# Patient Record
Sex: FEMALE | Employment: UNEMPLOYED | URBAN - METROPOLITAN AREA
[De-identification: names, ages, dates, MRNs, and addresses within clinical notes are randomized per-mention and may not be internally consistent; named-entity substitution may affect disease eponyms.]

---
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
## 2024-06-04 NOTE — PLAN OF CARE
Problem: Behavioral Health Plan of Care  Goal: Plan of Care Review  Outcome: Improving       Pt evaluation continues. Assessed mood, anxiety, thoughts, and behavior. Is progressing towards goals. Encourage participation in groups and developing healthy coping skills. Pt denies auditory or visual  hallucinations. Refer to daily team meeting notes for individualized plan of care. Will continue to assess.     Kirti continues on suicide and self injury precautions. She continues on Treatment Preparation phase. She denies suicidal ideation and self harm thoughts. She denies urges to punch the wall today. She states her hand no longer hurts and she has full range of motion and no swelling. She states she slept well last night. She states taking Lexapro at bedtime is working better for her as she feels more awake during the day. She denies any physical concerns. She is eating and drinking fluids well. She is attending groups and activities. She is looking forward to discharging tomorrow.    Spoke with patient regarding provider being on vacation and the need to change her appt... new appointment is Weds 7/24 @ 2PM

## 2024-07-13 ENCOUNTER — HEALTH MAINTENANCE LETTER (OUTPATIENT)
Age: 19
End: 2024-07-13

## 2024-11-07 SDOH — HEALTH STABILITY: PHYSICAL HEALTH: ON AVERAGE, HOW MANY DAYS PER WEEK DO YOU ENGAGE IN MODERATE TO STRENUOUS EXERCISE (LIKE A BRISK WALK)?: 5 DAYS

## 2024-11-07 SDOH — HEALTH STABILITY: PHYSICAL HEALTH: ON AVERAGE, HOW MANY MINUTES DO YOU ENGAGE IN EXERCISE AT THIS LEVEL?: 30 MIN

## 2024-11-07 ASSESSMENT — PATIENT HEALTH QUESTIONNAIRE - PHQ9: SUM OF ALL RESPONSES TO PHQ QUESTIONS 1-9: 3

## 2024-11-08 ENCOUNTER — OFFICE VISIT (OUTPATIENT)
Dept: FAMILY MEDICINE | Facility: CLINIC | Age: 19
End: 2024-11-08
Payer: COMMERCIAL

## 2024-11-08 ENCOUNTER — MYC MEDICAL ADVICE (OUTPATIENT)
Dept: FAMILY MEDICINE | Facility: CLINIC | Age: 19
End: 2024-11-08

## 2024-11-08 VITALS
SYSTOLIC BLOOD PRESSURE: 126 MMHG | RESPIRATION RATE: 16 BRPM | HEART RATE: 106 BPM | BODY MASS INDEX: 31.29 KG/M2 | DIASTOLIC BLOOD PRESSURE: 84 MMHG | WEIGHT: 187.8 LBS | OXYGEN SATURATION: 96 % | TEMPERATURE: 98.3 F | HEIGHT: 65 IN

## 2024-11-08 DIAGNOSIS — Z13.1 SCREENING FOR DIABETES MELLITUS: ICD-10-CM

## 2024-11-08 DIAGNOSIS — Z00.00 VISIT FOR PREVENTIVE HEALTH EXAMINATION: Primary | ICD-10-CM

## 2024-11-08 DIAGNOSIS — J30.1 SEASONAL ALLERGIC RHINITIS DUE TO POLLEN: ICD-10-CM

## 2024-11-08 DIAGNOSIS — Z13.6 ENCOUNTER FOR LIPID SCREENING FOR CARDIOVASCULAR DISEASE: ICD-10-CM

## 2024-11-08 DIAGNOSIS — Z11.4 SCREENING FOR HIV (HUMAN IMMUNODEFICIENCY VIRUS): ICD-10-CM

## 2024-11-08 DIAGNOSIS — E05.90 HYPERTHYROIDISM: ICD-10-CM

## 2024-11-08 DIAGNOSIS — Z13.220 ENCOUNTER FOR LIPID SCREENING FOR CARDIOVASCULAR DISEASE: ICD-10-CM

## 2024-11-08 DIAGNOSIS — Z71.85 IMMUNIZATION COUNSELING: ICD-10-CM

## 2024-11-08 DIAGNOSIS — Z11.59 NEED FOR HEPATITIS C SCREENING TEST: ICD-10-CM

## 2024-11-08 LAB
ANION GAP SERPL CALCULATED.3IONS-SCNC: 7 MMOL/L (ref 7–15)
BUN SERPL-MCNC: 6.8 MG/DL (ref 6–20)
CALCIUM SERPL-MCNC: 9.7 MG/DL (ref 8.8–10.4)
CHLORIDE SERPL-SCNC: 105 MMOL/L (ref 98–107)
CHOLEST SERPL-MCNC: 174 MG/DL
CREAT SERPL-MCNC: 0.65 MG/DL (ref 0.51–0.95)
EGFRCR SERPLBLD CKD-EPI 2021: >90 ML/MIN/1.73M2
FASTING STATUS PATIENT QL REPORTED: ABNORMAL
FASTING STATUS PATIENT QL REPORTED: NORMAL
GLUCOSE SERPL-MCNC: 94 MG/DL (ref 70–99)
HCO3 SERPL-SCNC: 25 MMOL/L (ref 22–29)
HDLC SERPL-MCNC: 55 MG/DL
HIV 1+2 AB+HIV1 P24 AG SERPL QL IA: NONREACTIVE
LDLC SERPL CALC-MCNC: 104 MG/DL
NONHDLC SERPL-MCNC: 119 MG/DL
POTASSIUM SERPL-SCNC: 4.4 MMOL/L (ref 3.4–5.3)
SODIUM SERPL-SCNC: 137 MMOL/L (ref 135–145)
T4 FREE SERPL-MCNC: 2.35 NG/DL (ref 0.9–1.7)
TRIGL SERPL-MCNC: 76 MG/DL
TSH SERPL DL<=0.005 MIU/L-ACNC: <0.01 UIU/ML (ref 0.3–4.2)

## 2024-11-08 PROCEDURE — 36415 COLL VENOUS BLD VENIPUNCTURE: CPT

## 2024-11-08 PROCEDURE — 80061 LIPID PANEL: CPT

## 2024-11-08 PROCEDURE — 90480 ADMN SARSCOV2 VAC 1/ONLY CMP: CPT

## 2024-11-08 PROCEDURE — 86706 HEP B SURFACE ANTIBODY: CPT

## 2024-11-08 PROCEDURE — 87389 HIV-1 AG W/HIV-1&-2 AB AG IA: CPT

## 2024-11-08 PROCEDURE — 86481 TB AG RESPONSE T-CELL SUSP: CPT

## 2024-11-08 PROCEDURE — 90471 IMMUNIZATION ADMIN: CPT

## 2024-11-08 PROCEDURE — 86735 MUMPS ANTIBODY: CPT

## 2024-11-08 PROCEDURE — 86765 RUBEOLA ANTIBODY: CPT

## 2024-11-08 PROCEDURE — 99000 SPECIMEN HANDLING OFFICE-LAB: CPT

## 2024-11-08 PROCEDURE — 84439 ASSAY OF FREE THYROXINE: CPT

## 2024-11-08 PROCEDURE — 99395 PREV VISIT EST AGE 18-39: CPT | Mod: 25

## 2024-11-08 PROCEDURE — 86803 HEPATITIS C AB TEST: CPT

## 2024-11-08 PROCEDURE — 86787 VARICELLA-ZOSTER ANTIBODY: CPT

## 2024-11-08 PROCEDURE — 86762 RUBELLA ANTIBODY: CPT

## 2024-11-08 PROCEDURE — 99213 OFFICE O/P EST LOW 20 MIN: CPT | Mod: 25

## 2024-11-08 PROCEDURE — 80048 BASIC METABOLIC PNL TOTAL CA: CPT

## 2024-11-08 PROCEDURE — 90656 IIV3 VACC NO PRSV 0.5 ML IM: CPT

## 2024-11-08 PROCEDURE — 91320 SARSCV2 VAC 30MCG TRS-SUC IM: CPT

## 2024-11-08 PROCEDURE — 84443 ASSAY THYROID STIM HORMONE: CPT

## 2024-11-08 PROCEDURE — 86658 ENTEROVIRUS ANTIBODY: CPT | Mod: 90

## 2024-11-08 RX ORDER — FLUTICASONE PROPIONATE 50 MCG
1-2 SPRAY, SUSPENSION (ML) NASAL DAILY
Qty: 16 G | Refills: 3 | Status: SHIPPED | OUTPATIENT
Start: 2024-11-08

## 2024-11-08 SDOH — HEALTH STABILITY: PHYSICAL HEALTH: ON AVERAGE, HOW MANY MINUTES DO YOU ENGAGE IN EXERCISE AT THIS LEVEL?: 40 MIN

## 2024-11-08 SDOH — HEALTH STABILITY: PHYSICAL HEALTH: ON AVERAGE, HOW MANY DAYS PER WEEK DO YOU ENGAGE IN MODERATE TO STRENUOUS EXERCISE (LIKE A BRISK WALK)?: 5 DAYS

## 2024-11-08 ASSESSMENT — PATIENT HEALTH QUESTIONNAIRE - PHQ9
10. IF YOU CHECKED OFF ANY PROBLEMS, HOW DIFFICULT HAVE THESE PROBLEMS MADE IT FOR YOU TO DO YOUR WORK, TAKE CARE OF THINGS AT HOME, OR GET ALONG WITH OTHER PEOPLE: SOMEWHAT DIFFICULT
SUM OF ALL RESPONSES TO PHQ QUESTIONS 1-9: 3

## 2024-11-08 ASSESSMENT — SOCIAL DETERMINANTS OF HEALTH (SDOH): HOW OFTEN DO YOU GET TOGETHER WITH FRIENDS OR RELATIVES?: ONCE A WEEK

## 2024-11-08 NOTE — PROGRESS NOTES
Preventive Care Visit  [facility]  IMCHELLE Chino CNP, Nurse Practitioner Primary Care  Nov 8, 2024      Assessment & Plan     Visit for preventive health examination    Screening for HIV (human immunodeficiency virus)  Discussed, ordered  - HIV Antigen Antibody Combo; Future  - HIV Antigen Antibody Combo    Need for hepatitis C screening test  Discussed, ordered  - Hepatitis C Screen Reflex to HCV RNA Quant and Genotype; Future  - Hepatitis C Screen Reflex to HCV RNA Quant and Genotype    Immunization counseling  Needed for school. Immunization record provided to patient.   - Varicella Zoster Virus Antibody IgG; Future  - Rubeola Antibody IgG; Future  - Rubella Antibody IgG; Future  - Mumps Immune Status, IgG; Future  - Poliovirus Antibodies; Future  - Hepatitis B Surface Antibody; Future  - Quantiferon-TB Gold Plus; Future  - Varicella Zoster Virus Antibody IgG  - Rubeola Antibody IgG  - Rubella Antibody IgG  - Mumps Immune Status, IgG  - Poliovirus Antibodies  - Hepatitis B Surface Antibody  - Quantiferon-TB Gold Plus    Encounter for lipid screening for cardiovascular disease  Fasting  - Lipid panel reflex to direct LDL Fasting; Future  - Lipid panel reflex to direct LDL Fasting    Screening for diabetes mellitus  Fasting  - Basic metabolic panel  (Ca, Cl, CO2, Creat, Gluc, K, Na, BUN); Future  - Basic metabolic panel  (Ca, Cl, CO2, Creat, Gluc, K, Na, BUN)    Hyperthyroidism  History of, has not taken Methimazole since February. Endocrinology order placed along with TSH orders.   - TSH with free T4 reflex; Future  - Adult Endocrinology  Referral; Future  - TSH with free T4 reflex    Seasonal allergic rhinitis due to pollen  Refilled.   - fluticasone (FLONASE) 50 MCG/ACT nasal spray; Spray 1-2 sprays into both nostrils daily. for allergy prevention.    BMI  Estimated body mass index is 31.49 kg/m  as calculated from the following:    Height as of this encounter: 1.645 m  "(5' 4.75\").    Weight as of this encounter: 85.2 kg (187 lb 12.8 oz).       Counseling  Appropriate preventive services were addressed with this patient via screening, questionnaire, or discussion as appropriate for fall prevention, nutrition, physical activity, Tobacco-use cessation, social engagement, weight loss and cognition.  Checklist reviewing preventive services available has been given to the patient.  Reviewed patient's diet, addressing concerns and/or questions.   The patient was instructed to see the dentist every 6 months.     Allan Cotto is a 19 year old, presenting for the following:  Physical        11/8/2024    10:56 AM   Additional Questions   Roomed by maricarmen jimenez         11/8/2024   Forms   Any forms needing to be completed Yes           HPI    Was on methimazole last in Feb.   Periods are regular, sometimes notices bulging eyes, occasionally rapid heartbeat, no anxiety    Going to nursing school   Works as a CNA    Health Care Directive  Patient does not have a Health Care Directive: Discussed advance care planning with patient; information given to patient to review.      11/8/2024   General Health   How would you rate your overall physical health? Good   Feel stress (tense, anxious, or unable to sleep) Not at all            11/8/2024   Nutrition   Three or more servings of calcium each day? Yes   Diet: Regular (no restrictions)   How many servings of fruit and vegetables per day? (!) 2-3   How many sweetened beverages each day? 0-1            11/8/2024   Exercise   Days per week of moderate/strenous exercise 5 days   Average minutes spent exercising at this level 40 min            11/8/2024   Social Factors   Frequency of gathering with friends or relatives Once a week   Worry food won't last until get money to buy more Patient declined   Food not last or not have enough money for food? Patient declined   Do you have housing? (Housing is defined as stable permanent housing and does not " "include staying ouside in a car, in a tent, in an abandoned building, in an overnight shelter, or couch-surfing.) Patient declined   Are you worried about losing your housing? Patient declined   Lack of transportation? Patient declined   Unable to get utilities (heat,electricity)? Patient declined          11/8/2024   Dental   Dentist two times every year? (!) NO    Discussed      11/8/2024   TB Screening   Were you born outside of the [place]? No      Today's PHQ-9 Score:       11/7/2024     6:03 PM   PHQ-9 SCORE   PHQ-9 Total Score MyChart 3 (Minimal depression)   PHQ-9 Total Score 3        Patient-reported     Has therapy on a weekly basis.         11/8/2024   Substance Use   Alcohol more than 3/day or more than 7/wk Not Applicable   Do you use any other substances recreationally? No      Social History     Tobacco Use    Smoking status: Never     Passive exposure: Yes    Smokeless tobacco: Never    Tobacco comments:     father   Vaping Use    Vaping status: Never Used   Substance Use Topics    Alcohol use: Not Currently    Drug use: Yes     Types: Marijuana     Comment: Last smoked marijuana 12/11 11/8/2024   One time HIV Screening   Previous HIV test? No          11/8/2024   STI Screening   New sexual partner(s) since last STI/HIV test? No        History of abnormal Pap smear: No - under age 21, PAP not appropriate for age             11/8/2024   Contraception/Family Planning   Questions about contraception or family planning No           Reviewed and updated as needed this visit by Provider   Tobacco  Allergies  Meds  Problems  Med Hx  Surg Hx  Fam Hx                  Review of Systems  Constitutional, HEENT, cardiovascular, pulmonary, gi and gu systems are negative, except as otherwise noted.     Objective    Exam  /84 (BP Location: Right arm, Patient Position: Sitting, Cuff Size: Adult Regular)   Pulse 106   Temp 98.3  F (36.8  C) (Temporal)   Resp 16   Ht 1.645 m (5' 4.75\")   Wt 85.2 " "kg (187 lb 12.8 oz)   LMP 10/12/2024 (Exact Date)   SpO2 96%   BMI 31.49 kg/m     Estimated body mass index is 31.49 kg/m  as calculated from the following:    Height as of this encounter: 1.645 m (5' 4.75\").    Weight as of this encounter: 85.2 kg (187 lb 12.8 oz).    Physical Exam  GENERAL: alert and no distress  EYES: Eyes grossly normal to inspection, PERRL and conjunctivae and sclerae normal  HENT: ear canals and TM's normal, nose and mouth without ulcers or lesions  NECK: no adenopathy, no asymmetry, masses, or scars  RESP: lungs clear to auscultation - no rales, rhonchi or wheezes  CV: regular rate and rhythm, normal S1 S2, no S3 or S4, no murmur, click or rub, no peripheral edema  ABDOMEN: soft, nontender, no hepatosplenomegaly, no masses and bowel sounds normal  MS: no gross musculoskeletal defects noted, no edema  SKIN: no suspicious lesions or rashes  NEURO: Normal strength and tone, mentation intact and speech normal  PSYCH: mentation appears normal, affect normal/bright    Vision Screen  Vision Screen Details  Reason Vision Screen Not Completed: Screening Recommend: Patient/Guardian Declined    Hearing Screen  Hearing Screen Not Completed  Reason Hearing Screen was not completed: Other  Comments (C&TC Required):: patient declined        Signed Electronically by: MICHELLE Chino CNP    Answers submitted by the patient for this visit:  Patient Health Questionnaire (Submitted on 11/8/2024)  If you checked off any problems, how difficult have these problems made it for you to do your work, take care of things at home, or get along with other people?: Somewhat difficult  PHQ9 TOTAL SCORE: 3    " Minimal (Score 0-3)

## 2024-11-08 NOTE — TELEPHONE ENCOUNTER
These forms were taken care of in office visit.  Closing encounter  Placido Matthews MA on 11/8/2024 at 2:27 PM

## 2024-11-09 LAB
GAMMA INTERFERON BACKGROUND BLD IA-ACNC: 0.05 IU/ML
HBV SURFACE AB SERPL IA-ACNC: 154 M[IU]/ML
HBV SURFACE AB SERPL IA-ACNC: REACTIVE M[IU]/ML
HCV AB SERPL QL IA: NONREACTIVE
M TB IFN-G BLD-IMP: NEGATIVE
M TB IFN-G CD4+ BCKGRND COR BLD-ACNC: 9.95 IU/ML
MITOGEN IGNF BCKGRD COR BLD-ACNC: 0 IU/ML
MITOGEN IGNF BCKGRD COR BLD-ACNC: 0 IU/ML
QUANTIFERON MITOGEN: 10 IU/ML
QUANTIFERON NIL TUBE: 0.05 IU/ML
QUANTIFERON TB1 TUBE: 0.05 IU/ML
QUANTIFERON TB2 TUBE: 0.05

## 2024-11-11 LAB
MEV IGG SER IA-ACNC: >300 AU/ML
MEV IGG SER IA-ACNC: POSITIVE
MUMPS ANTIBODY IGG INSTRUMENT VALUE: 136 AU/ML
MUV IGG SER QL IA: POSITIVE
RUBV IGG SERPL QL IA: 3.44 INDEX
RUBV IGG SERPL QL IA: POSITIVE
VZV IGG SER QL IA: 1.62 S/CO
VZV IGG SER QL IA: POSITIVE

## 2024-11-11 NOTE — RESULT ENCOUNTER NOTE
Hilario Cotto,     Your thyroid levels came back indicating hyperthyroidism as we suspected. I recommend that you make an appointment with an endocrinologist within the next month. If you are unable to schedule an appointment, develops anxiety or racing heart, please let me know.     Brook FUCHS

## 2024-11-17 LAB
PV1 NAB TITR SER NT: NORMAL {TITER}
PV3 NAB TITR SER NT: NORMAL {TITER}

## 2025-01-07 ENCOUNTER — VIRTUAL VISIT (OUTPATIENT)
Dept: ENDOCRINOLOGY | Facility: CLINIC | Age: 20
End: 2025-01-07
Payer: COMMERCIAL

## 2025-01-07 DIAGNOSIS — E05.00 GRAVES' DISEASE: Primary | ICD-10-CM

## 2025-01-07 PROCEDURE — 98002 SYNCH AUDIO-VIDEO NEW MOD 45: CPT | Performed by: INTERNAL MEDICINE

## 2025-01-07 RX ORDER — PROPRANOLOL HYDROCHLORIDE 10 MG/1
10 TABLET ORAL 3 TIMES DAILY
Qty: 90 TABLET | Refills: 1 | Status: SHIPPED | OUTPATIENT
Start: 2025-01-07

## 2025-01-07 NOTE — PROGRESS NOTES
Video-Visit Details    Type of service:  Video Visit    Joined the call at 1/7/2025, 3:31:12 pm.  Left the call at 1/7/2025, 4:02:25 pm.  Originating Location (pt. Location): Home  Distant Location (provider location): Off-site    Mode of Communication:  Video Conference via [employer]    =======================================================  Assessment     Kirti Dent is a 19 year old female transferring endocrinology care for Graves' disease.  She was diagnosed with Graves' disease in 2021, when the thyroid uptake and scan revealed a diffuse uptake of 61%, with an estimated gland weight of 30.5 g.  Thyroid-stimulating immunoglobulins were positive at 3.1.  Methimazole was prescribed but the patient only took it for approximately 1 year.  Her thyroid hormone levels have never normalized.  Most recent lab results from November 2024 revealed persistent hyperthyroidism.  Clinically, she has no evidence of Graves' ophthalmopathy and she does endorse episodes of anxiety and rapid rhythm palpitations, spontaneous weight loss.    Discussed about treatment options - radioiodine vs antithyroid drugs vs surgery. Surgery is an unpopular therapy for Graves' hyperthyroidism.   Methimazole provides control of hyperthyroidism as long as the drug is taken. The persistent remission rate when the drug is discontinued one to two years later averages about 4 really thinks 0 percent. Prolonged thionamide therapy lasting even decades is an acceptable alternative as long as the drug is tolerated and the hyperthyroidism is controlled. Methimazole side effects were discussed: commonly GI symptoms rarely agranulocytosis, anemia or hepatotoxicity.     In view of her history of noncompliance with treatment with methimazole, I recommended to consider radioiodine ablation.  131I therapy is likely to result in permanent hypothyroidism requiring daily medication for life.  Radioiodine therapy may be associated with an increased risk of  the development or worsening of Graves' ophthalmopathy. I have counseled her on safety radiation precautions.     For now, the patient opted to resume treatment with methimazole.  Recommendations:  Recheck the TFTs together with TSI, CBC, ALT and AST  Adjust the dose of methimazole based on lab results.  Plan to start methimazole at 5 mg twice daily.  Start treatment with propranolol at 10 mg 3 times daily.  Instructed the patient to check her heart rate at home and contact us if it is below 60 or above 90.  Return to clinic to be scheduled in 3 months, with labs prior.    Orders Placed This Encounter   Procedures    AST    ALT    Thyroid stimulating immunoglobulin    T3 total    TSH    T4 free    T4 free    TSH    T3 total    CBC with Platelets & Differential       =======================================================  The patient is seen in consultation at the request of Dr. Brook Lynch.     History of Present Illness:  Kirti Dent is a 19 year old female who is transferring care from pediatric endocrinology.     Previous history is reviewed:  Kirti was found to have abnormal thyroid functions 12/2021 during a hospitalization related to major depression and a suicide attempt.  At that time, her TSH was suppressed on 2 separate occasions.  The free T4 was mildly elevated at 1.83 and 1.66.  On 12/17/2021 the total T3 was normal.  The thyroid-stimulating immunoglobulin (TSI) was elevated consistent with autoimmune hyperthyroidism or Graves' disease. She has not had any symptoms suggestive of hyperthyroidism such as tremor, weight loss, difficulty sleeping, rapid heart rate or palpitations. The thyroid uptake and scan from January 2022 confirmed the diagnosis of Graves' disease.  The uptake was elevated at 61%, symmetrical and diffuse.  The total computer estimated weight of the gland was 30.5 g.      Subsequently, the patient was started on treatment with methimazole, 15 mg 3 times a day.  Prior notes  document a history of noncompliance. The patient remembers taking methimazole for approximately one year after being prescribed but then she stopped taking it without providing a reason.  She has been off methimazole since the end of 2022.    Kirti reports experiencing episodes of rapid heart beats associated with increased anxiety, for years.  Lately, the episodes occur a couple of times a week and that they last 10 minutes at most.  She has not been checking her heart rate at home.    Pertinent labs reviewed:  11/8/2024 TSH undetectable, free T4 2.35, unremarkable BMP  7/6/2023 normal liver panel  2/28/2022 unremarkable CBC with borderline anemia    2/28/2022 TSH undetectable, total T3 131 (), free T4 1.49 (0.76-1.46)  1/6/2022 undetectable thyroid peroxidase and antithyroglobulin antibodies  12/17/2021 TSI 3.1    TSH has been persistently undetectable since 2021.    Past Medical History   Anxiety   Past Medical History:   Diagnosis Date    Depressive disorder December 2021    Mild intermittent asthma without complication     Seasonal allergic rhinitis    Graves     Past Surgical History   Past Surgical History:   Procedure Laterality Date    MOUTH SURGERY  2009       Current Medications    Current Outpatient Medications:     cetirizine (ZYRTEC) 10 MG tablet, Take 1 tablet (10 mg) by mouth daily as needed for allergies, Disp: 365 tablet, Rfl: prn    fluticasone (FLONASE) 50 MCG/ACT nasal spray, Spray 1-2 sprays into both nostrils daily. for allergy prevention., Disp: 16 g, Rfl: 3  No current facility-administered medications for this visit.    Facility-Administered Medications Ordered in Other Visits:     acetaminophen (TYLENOL) tablet 650 mg, 650 mg, Oral, Q4H PRN, iNrmal Webber MD    benzocaine-menthol (CEPACOL) 15-3.6 MG lozenge 1 lozenge, 1 lozenge, Buccal, Q1H PRN, Nirmal Webber MD    calcium carbonate (TUMS) chewable tablet 1,000 mg, 1,000 mg, Oral, Q2H PRN, Nirmal Webber MD    Family History    Problem Relation Age of Onset    Diabetes Maternal Grandfather     Cerebrovascular Disease Maternal Grandfather         stroke    Aneurysm Paternal Grandfather         brain    Thyroid Disease Paternal Grandmother     Abdominal Aortic Aneurysm No family hx of    Diabetes mellitus: Maternal grandfather with Type 2 Diabetes Mellitus, Maternal great grandfather with Type 2 Diabetes Mellitus.  Thyroid disease: paternal grandmother has thyroid issues, paternal aunt had a thyroidectomy, no history of thyroid cancer.  Maternal great grandfather and maternal great grandmother - cancer. Maternal grandmother - ? Ovarian cancer.     Social History  Single. Plans to move to a dorm. Her mother has 2 bedroom and 2 bathroom. No children. She used to smoke marihuana. Denies smoking. Occasionally drinking alcohol . Denies using illicit drugs. Occupation: student - nursing, [employer].     Review of Systems   Systemic:              longstanding fatigue, since age 8 yo; weight down from 200 lbs in August 2024 to 172 lbs now, spontaneously. Gains and loses weight very easily.     Eye:                      No eye symptoms; no dry or watery eyes; wears glasses   Gerber-Laryngeal:     No gerber-laryngeal symptoms, no dysphagia, no hoarseness, no cough     Breast:                  No breast symptoms  Cardiovascular:    No CP   Pulmonary:           No pulmonary symptoms, no SOB or cough    Gastrointestinal:   No gastrointestinal symptoms, no diarrhea or constipation   Genitourinary:       No genitourinary symptoms, no increased thirst or urination   Endocrine:            No cold or heat intolerance; she randomly break out into sweats, since age 15; no night sweats; MP are regular   Neurological:        No neurological symptoms, no headaches, no tremor, no numbness or tingling sensation, no dizziness   Musculoskeletal:  No musculoskeletal symptoms, no muscle or joint pain   Skin:                    lately she has been getting eczema patches - on  the side of the breast, back and right leg   Psychological:     anxiety and depression - goes to weekly therapy               Vital Signs     Previous Weights:    Wt Readings from Last 10 Encounters:   11/08/24 85.2 kg (187 lb 12.8 oz)   04/19/22 82.4 kg (181 lb 9.6 oz) (96%, Z= 1.71)*   02/28/22 82.2 kg (181 lb 3.5 oz) (96%, Z= 1.71)*   02/18/22 79.5 kg (175 lb 3.2 oz) (95%, Z= 1.60)*   02/08/22 78.7 kg (173 lb 9.6 oz) (94%, Z= 1.58)*   01/06/22 76.8 kg (169 lb 5 oz) (93%, Z= 1.50)*   01/04/22 78.2 kg (172 lb 6.4 oz) (94%, Z= 1.56)*   12/18/21 78.9 kg (173 lb 15.1 oz) (94%, Z= 1.59)*   12/12/21 83.5 kg (184 lb) (96%, Z= 1.76)*   10/30/19 79.7 kg (175 lb 9.6 oz) (96%, Z= 1.75)*     * Growth percentiles are based on [facility] (Girls, 2-20 Years) data.        BP Readings from Last 6 Encounters:   11/08/24 126/84   04/19/22 118/74 (77%, Z = 0.74 /  83%, Z = 0.95)*   02/28/22 118/77 (77%, Z = 0.74 /  89%, Z = 1.23)*   02/08/22 (!) 128/92 (95%, Z = 1.64 /  >99 %, Z >2.33)*   01/06/22 123/79 (88%, Z = 1.17 /  93%, Z = 1.48)*   01/04/22 114/75 (68%, Z = 0.47 /  85%, Z = 1.04)*     *BP percentiles are based on the 2017 AAP Clinical Practice Guideline for girls     LMP 10/12/2024 (Exact Date)     Physical Exam  General Appearance: alert, no distress noted   Eyes: grossly normal to inspection, conjunctivae and sclerae normal, no lid lag or stare   No visible thyroid enlargement  Respiratory: no audible wheeze, cough, or visible cyanosis.  No visible retractions or increased work of breathing.  Able to speak fully in complete sentences.  Neurological: Cranial nerves grossly intact, mentation intact and speech normal; no tremor of the hands   Skin: no lesions on exposed skin   Psychological: mentation appears normal, affect normal, judgement and insight intact, normal speech and appearance well-groomed     Lab Results  I reviewed prior lab results documented in Epic.  TSH   Date Value Ref Range Status   11/08/2024 <0.01 (L) 0.30 -  4.20 uIU/mL Final   02/28/2022 <0.01 (L) 0.40 - 4.00 mU/L Final   01/06/2022 <0.01 (L) 0.40 - 4.00 mU/L Final   12/17/2021 <0.01 (L) 0.40 - 4.00 mU/L Final   12/15/2021 <0.01 (L) 0.40 - 4.00 mU/L Final   08/06/2018 1.47 0.40 - 4.00 mU/L Final

## 2025-01-07 NOTE — NURSING NOTE
Current patient location: [address] [place] [address] AVE [facility] [address]    Is the patient currently in the state of [place]? YES    Visit mode:VIDEO    If the visit is dropped, the patient can be reconnected by:VIDEO VISIT: Text to cell phone:   Telephone Information:   Mobile [phone]       Will anyone else be joining the visit? NO  (If patient encounters technical issues they should call [phone]    Are changes needed to the allergy or medication list? Yes      Are refills needed on medications prescribed by this physician? NO    Rooming Documentation:  Not applicable    Reason for visit: Consult (Hyperthyroidism )    Fozia RANGEL

## 2025-01-07 NOTE — LETTER
1/7/2025      Kirti Dent  [address]  [place] [address]      Dear Colleague,    Thank you for referring your patient, Kirti Dent, to the [facility]. Please see a copy of my visit note below.    Video-Visit Details    Type of service:  Video Visit    Joined the call at 1/7/2025, 3:31:12 pm.  Left the call at 1/7/2025, 4:02:25 pm.  Originating Location (pt. Location): Home  Distant Location (provider location): Off-site    Mode of Communication:  Video Conference via [facility]    =======================================================  Assessment     Kirti Dent is a 19 year old female transferring endocrinology care for Graves' disease.  She was diagnosed with Graves' disease in 2021, when the thyroid uptake and scan revealed a diffuse uptake of 61%, with an estimated gland weight of 30.5 g.  Thyroid-stimulating immunoglobulins were positive at 3.1.  Methimazole was prescribed but the patient only took it for approximately 1 year.  Her thyroid hormone levels have never normalized.  Most recent lab results from November 2024 revealed persistent hyperthyroidism.  Clinically, she has no evidence of Graves' ophthalmopathy and she does endorse episodes of anxiety and rapid rhythm palpitations, spontaneous weight loss.    Discussed about treatment options - radioiodine vs antithyroid drugs vs surgery. Surgery is an unpopular therapy for Graves' hyperthyroidism.   Methimazole provides control of hyperthyroidism as long as the drug is taken. The persistent remission rate when the drug is discontinued one to two years later averages about 4 really thinks 0 percent. Prolonged thionamide therapy lasting even decades is an acceptable alternative as long as the drug is tolerated and the hyperthyroidism is controlled. Methimazole side effects were discussed: commonly GI symptoms rarely agranulocytosis, anemia or hepatotoxicity.     In view of her history of noncompliance  with treatment with methimazole, I recommended to consider radioiodine ablation.  131I therapy is likely to result in permanent hypothyroidism requiring daily medication for life.  Radioiodine therapy may be associated with an increased risk of the development or worsening of Graves' ophthalmopathy. I have counseled her on safety radiation precautions.     For now, the patient opted to resume treatment with methimazole.  Recommendations:  Recheck the TFTs together with TSI, CBC, ALT and AST  Adjust the dose of methimazole based on lab results.  Plan to start methimazole at 5 mg twice daily.  Start treatment with propranolol at 10 mg 3 times daily.  Instructed the patient to check her heart rate at home and contact us if it is below 60 or above 90.  Return to clinic to be scheduled in 3 months, with labs prior.    Orders Placed This Encounter   Procedures    AST    ALT    Thyroid stimulating immunoglobulin    T3 total    TSH    T4 free    T4 free    TSH    T3 total    CBC with Platelets & Differential       =======================================================  The patient is seen in consultation at the request of Dr. Brook Lynch.     History of Present Illness:  Kirti Dent is a 19 year old female who is transferring care from pediatric endocrinology.     Previous history is reviewed:  Kirti was found to have abnormal thyroid functions 12/2021 during a hospitalization related to major depression and a suicide attempt.  At that time, her TSH was suppressed on 2 separate occasions.  The free T4 was mildly elevated at 1.83 and 1.66.  On 12/17/2021 the total T3 was normal.  The thyroid-stimulating immunoglobulin (TSI) was elevated consistent with autoimmune hyperthyroidism or Graves' disease. She has not had any symptoms suggestive of hyperthyroidism such as tremor, weight loss, difficulty sleeping, rapid heart rate or palpitations. The thyroid uptake and scan from January 2022 confirmed the diagnosis of  Graves' disease.  The uptake was elevated at 61%, symmetrical and diffuse.  The total computer estimated weight of the gland was 30.5 g.      Subsequently, the patient was started on treatment with methimazole, 15 mg 3 times a day.  Prior notes document a history of noncompliance. The patient remembers taking methimazole for approximately one year after being prescribed but then she stopped taking it without providing a reason.  She has been off methimazole since the end of 2022.    Kirti reports experiencing episodes of rapid heart beats associated with increased anxiety, for years.  Lately, the episodes occur a couple of times a week and that they last 10 minutes at most.  She has not been checking her heart rate at home.    Pertinent labs reviewed:  11/8/2024 TSH undetectable, free T4 2.35, unremarkable BMP  7/6/2023 normal liver panel  2/28/2022 unremarkable CBC with borderline anemia    2/28/2022 TSH undetectable, total T3 131 (), free T4 1.49 (0.76-1.46)  1/6/2022 undetectable thyroid peroxidase and antithyroglobulin antibodies  12/17/2021 TSI 3.1    TSH has been persistently undetectable since 2021.    Past Medical History   Anxiety   Past Medical History:   Diagnosis Date    Depressive disorder December 2021    Mild intermittent asthma without complication     Seasonal allergic rhinitis    Graves     Past Surgical History   Past Surgical History:   Procedure Laterality Date    MOUTH SURGERY  2009       Current Medications    Current Outpatient Medications:     cetirizine (ZYRTEC) 10 MG tablet, Take 1 tablet (10 mg) by mouth daily as needed for allergies, Disp: 365 tablet, Rfl: prn    fluticasone (FLONASE) 50 MCG/ACT nasal spray, Spray 1-2 sprays into both nostrils daily. for allergy prevention., Disp: 16 g, Rfl: 3  No current facility-administered medications for this visit.    Facility-Administered Medications Ordered in Other Visits:     acetaminophen (TYLENOL) tablet 650 mg, 650 mg, Oral, Q4H PRN,  Nirmal Webber MD    benzocaine-menthol (CEPACOL) 15-3.6 MG lozenge 1 lozenge, 1 lozenge, Buccal, Q1H PRN, Nirmal Webber MD    calcium carbonate (TUMS) chewable tablet 1,000 mg, 1,000 mg, Oral, Q2H PRN, Nirmal Webber MD    Family History   Problem Relation Age of Onset    Diabetes Maternal Grandfather     Cerebrovascular Disease Maternal Grandfather         stroke    Aneurysm Paternal Grandfather         brain    Thyroid Disease Paternal Grandmother     Abdominal Aortic Aneurysm No family hx of    Diabetes mellitus: Maternal grandfather with Type 2 Diabetes Mellitus, Maternal great grandfather with Type 2 Diabetes Mellitus.  Thyroid disease: paternal grandmother has thyroid issues, paternal aunt had a thyroidectomy, no history of thyroid cancer.  Maternal great grandfather and maternal great grandmother - cancer. Maternal grandmother - ? Ovarian cancer.     Social History  Single. Plans to move to a dorm. Her mother has 2 bedroom and 2 bathroom. No children. She used to smoke marihuana. Denies smoking. Occasionally drinking alcohol . Denies using illicit drugs. Occupation: student - nursing, [employer].     Review of Systems   Systemic:              longstanding fatigue, since age 6 yo; weight down from 200 lbs in August 2024 to 172 lbs now, spontaneously. Gains and loses weight very easily.     Eye:                      No eye symptoms; no dry or watery eyes; wears glasses   Gerber-Laryngeal:     No gerber-laryngeal symptoms, no dysphagia, no hoarseness, no cough     Breast:                  No breast symptoms  Cardiovascular:    No CP   Pulmonary:           No pulmonary symptoms, no SOB or cough    Gastrointestinal:   No gastrointestinal symptoms, no diarrhea or constipation   Genitourinary:       No genitourinary symptoms, no increased thirst or urination   Endocrine:            No cold or heat intolerance; she randomly break out into sweats, since age 15; no night sweats; MP are regular   Neurological:        No  neurological symptoms, no headaches, no tremor, no numbness or tingling sensation, no dizziness   Musculoskeletal:  No musculoskeletal symptoms, no muscle or joint pain   Skin:                    lately she has been getting eczema patches - on the side of the breast, back and right leg   Psychological:     anxiety and depression - goes to weekly therapy               Vital Signs     Previous Weights:    Wt Readings from Last 10 Encounters:   11/08/24 85.2 kg (187 lb 12.8 oz)   04/19/22 82.4 kg (181 lb 9.6 oz) (96%, Z= 1.71)*   02/28/22 82.2 kg (181 lb 3.5 oz) (96%, Z= 1.71)*   02/18/22 79.5 kg (175 lb 3.2 oz) (95%, Z= 1.60)*   02/08/22 78.7 kg (173 lb 9.6 oz) (94%, Z= 1.58)*   01/06/22 76.8 kg (169 lb 5 oz) (93%, Z= 1.50)*   01/04/22 78.2 kg (172 lb 6.4 oz) (94%, Z= 1.56)*   12/18/21 78.9 kg (173 lb 15.1 oz) (94%, Z= 1.59)*   12/12/21 83.5 kg (184 lb) (96%, Z= 1.76)*   10/30/19 79.7 kg (175 lb 9.6 oz) (96%, Z= 1.75)*     * Growth percentiles are based on [facility] (Girls, 2-20 Years) data.        BP Readings from Last 6 Encounters:   11/08/24 126/84   04/19/22 118/74 (77%, Z = 0.74 /  83%, Z = 0.95)*   02/28/22 118/77 (77%, Z = 0.74 /  89%, Z = 1.23)*   02/08/22 (!) 128/92 (95%, Z = 1.64 /  >99 %, Z >2.33)*   01/06/22 123/79 (88%, Z = 1.17 /  93%, Z = 1.48)*   01/04/22 114/75 (68%, Z = 0.47 /  85%, Z = 1.04)*     *BP percentiles are based on the 2017 AAP Clinical Practice Guideline for girls     LMP 10/12/2024 (Exact Date)     Physical Exam  General Appearance: alert, no distress noted   Eyes: grossly normal to inspection, conjunctivae and sclerae normal, no lid lag or stare   No visible thyroid enlargement  Respiratory: no audible wheeze, cough, or visible cyanosis.  No visible retractions or increased work of breathing.  Able to speak fully in complete sentences.  Neurological: Cranial nerves grossly intact, mentation intact and speech normal; no tremor of the hands   Skin: no lesions on exposed skin   Psychological:  mentation appears normal, affect normal, judgement and insight intact, normal speech and appearance well-groomed     Lab Results  I reviewed prior lab results documented in Epic.  TSH   Date Value Ref Range Status   11/08/2024 <0.01 (L) 0.30 - 4.20 uIU/mL Final   02/28/2022 <0.01 (L) 0.40 - 4.00 mU/L Final   01/06/2022 <0.01 (L) 0.40 - 4.00 mU/L Final   12/17/2021 <0.01 (L) 0.40 - 4.00 mU/L Final   12/15/2021 <0.01 (L) 0.40 - 4.00 mU/L Final   08/06/2018 1.47 0.40 - 4.00 mU/L Final       Again, thank you for allowing me to participate in the care of your patient.        Sincerely,    Viloette Moran MD  Electronically signed

## 2025-01-08 ENCOUNTER — TELEPHONE (OUTPATIENT)
Dept: ENDOCRINOLOGY | Facility: CLINIC | Age: 20
End: 2025-01-08

## 2025-01-08 NOTE — TELEPHONE ENCOUNTER
8x simple interrupted sutures removed from L dorsal FA. Left Voicemail (1st Attempt) for the patient to call back and schedule the following:    Appointment type: return  Provider: dr. franco  Return date: 4/7/2025   Specialty phone number: [phone]   Additonal Notes: Return in about 3 months (around 4/7/2025) for nonfasting labs, labs prior to f/up apt. [     [facility]   Orthopedics, Podiatry, Sports Medicine, Ent ,Eye , Audiology, Adult Endocrine & Diabetes, Nutrition & Medication Therapy Management Specialties   [facility]

## 2025-08-01 ENCOUNTER — MYC MEDICAL ADVICE (OUTPATIENT)
Dept: FAMILY MEDICINE | Facility: CLINIC | Age: 20
End: 2025-08-01

## 2025-08-01 PROBLEM — F32.A DEPRESSION: Status: ACTIVE | Noted: 2022-01-04

## 2025-08-01 PROBLEM — T50.901A OVERDOSE: Status: RESOLVED | Noted: 2021-12-12 | Resolved: 2025-08-01

## 2025-08-01 PROBLEM — T46.1X2A: Status: RESOLVED | Noted: 2021-12-12 | Resolved: 2025-08-01

## 2025-08-01 PROBLEM — Z91.51 HISTORY OF SUICIDE ATTEMPT: Status: ACTIVE | Noted: 2021-12-15

## 2025-08-01 PROBLEM — E66.9 CHILDHOOD OBESITY: Status: RESOLVED | Noted: 2018-08-06 | Resolved: 2025-08-01
